# Patient Record
Sex: MALE | Race: WHITE | NOT HISPANIC OR LATINO
[De-identification: names, ages, dates, MRNs, and addresses within clinical notes are randomized per-mention and may not be internally consistent; named-entity substitution may affect disease eponyms.]

---

## 2021-12-15 ENCOUNTER — LABORATORY RESULT (OUTPATIENT)
Age: 86
End: 2021-12-15

## 2021-12-15 ENCOUNTER — APPOINTMENT (OUTPATIENT)
Dept: HEMATOLOGY ONCOLOGY | Facility: CLINIC | Age: 86
End: 2021-12-15
Payer: MEDICARE

## 2021-12-15 VITALS
BODY MASS INDEX: 22.53 KG/M2 | OXYGEN SATURATION: 98 % | RESPIRATION RATE: 18 BRPM | SYSTOLIC BLOOD PRESSURE: 163 MMHG | TEMPERATURE: 97.8 F | DIASTOLIC BLOOD PRESSURE: 64 MMHG | WEIGHT: 170 LBS | HEIGHT: 73 IN | HEART RATE: 63 BPM

## 2021-12-15 DIAGNOSIS — G60.0 HEREDITARY MOTOR AND SENSORY NEUROPATHY: ICD-10-CM

## 2021-12-15 DIAGNOSIS — D69.6 THROMBOCYTOPENIA, UNSPECIFIED: ICD-10-CM

## 2021-12-15 DIAGNOSIS — Z78.9 OTHER SPECIFIED HEALTH STATUS: ICD-10-CM

## 2021-12-15 DIAGNOSIS — Z85.820 PERSONAL HISTORY OF MALIGNANT MELANOMA OF SKIN: ICD-10-CM

## 2021-12-15 PROCEDURE — 99205 OFFICE O/P NEW HI 60 MIN: CPT

## 2021-12-15 RX ORDER — UBIDECARENONE/VIT E ACET 100MG-5
1000 CAPSULE ORAL
Refills: 0 | Status: ACTIVE | COMMUNITY

## 2021-12-15 RX ORDER — METFORMIN ER 750 MG 750 MG/1
750 TABLET ORAL TWICE DAILY
Refills: 0 | Status: ACTIVE | COMMUNITY

## 2021-12-15 RX ORDER — CHLORPHENIR/PHENYLEPH/ASPIRIN 2-7.8-325
TABLET, EFFERVESCENT ORAL
Refills: 0 | Status: ACTIVE | COMMUNITY

## 2021-12-15 RX ORDER — ROSUVASTATIN CALCIUM 20 MG/1
20 TABLET, FILM COATED ORAL
Refills: 0 | Status: ACTIVE | COMMUNITY

## 2021-12-15 RX ORDER — TAMSULOSIN HYDROCHLORIDE 0.4 MG/1
0.4 CAPSULE ORAL DAILY
Refills: 0 | Status: ACTIVE | COMMUNITY

## 2021-12-15 RX ORDER — CYANOCOBALAMIN (VITAMIN B-12) 1000 MCG
1000 TABLET ORAL
Refills: 0 | Status: ACTIVE | COMMUNITY

## 2021-12-15 RX ORDER — VALSARTAN 160 MG/1
160 TABLET ORAL
Refills: 0 | Status: ACTIVE | COMMUNITY

## 2021-12-17 LAB
25(OH)D3 SERPL-MCNC: 43.6 NG/ML
ALBUMIN SERPL ELPH-MCNC: 4.7 G/DL
ALP BLD-CCNC: 62 U/L
ALT SERPL-CCNC: 15 U/L
ANION GAP SERPL CALC-SCNC: 19 MMOL/L
APPEARANCE: CLEAR
AST SERPL-CCNC: 25 U/L
BILIRUB SERPL-MCNC: 0.5 MG/DL
BILIRUBIN URINE: NEGATIVE
BLOOD URINE: NEGATIVE
BUN SERPL-MCNC: 27 MG/DL
CALCIUM SERPL-MCNC: 9.6 MG/DL
CHLORIDE SERPL-SCNC: 105 MMOL/L
CO2 SERPL-SCNC: 21 MMOL/L
COLOR: YELLOW
CREAT SERPL-MCNC: 1.15 MG/DL
DEPRECATED KAPPA LC FREE/LAMBDA SER: 1.34 RATIO
ERYTHROCYTE [SEDIMENTATION RATE] IN BLOOD BY WESTERGREN METHOD: 13 MM/HR
FERRITIN SERPL-MCNC: 463 NG/ML
FOLATE SERPL-MCNC: >20 NG/ML
GLUCOSE QUALITATIVE U: NEGATIVE
GLUCOSE SERPL-MCNC: 91 MG/DL
HAPTOGLOB SERPL-MCNC: 135 MG/DL
IRON SATN MFR SERPL: 46 %
IRON SERPL-MCNC: 146 UG/DL
KAPPA LC CSF-MCNC: 1.68 MG/DL
KAPPA LC SERPL-MCNC: 2.25 MG/DL
KETONES URINE: NEGATIVE
LDH SERPL-CCNC: 255 U/L
LEUKOCYTE ESTERASE URINE: NEGATIVE
MAGNESIUM SERPL-MCNC: 2.6 MG/DL
NITRITE URINE: NEGATIVE
PH URINE: 5
PHOSPHATE SERPL-MCNC: 3.7 MG/DL
POTASSIUM SERPL-SCNC: 5 MMOL/L
PROT SERPL-MCNC: 6.9 G/DL
PROTEIN URINE: NEGATIVE
PSA FREE FLD-MCNC: 36 %
PSA FREE SERPL-MCNC: 0.81 NG/ML
PSA SERPL-MCNC: 2.25 NG/ML
RBC # BLD: 2.57 M/UL
RETICS # AUTO: 2.6 %
RETICS AGGREG/RBC NFR: 66.6 K/UL
SODIUM SERPL-SCNC: 145 MMOL/L
SPECIFIC GRAVITY URINE: 1.02
TIBC SERPL-MCNC: 320 UG/DL
TSH SERPL-ACNC: 2.1 UIU/ML
UIBC SERPL-MCNC: 173 UG/DL
UROBILINOGEN URINE: NORMAL
VIT B12 SERPL-MCNC: >2000 PG/ML

## 2021-12-17 NOTE — HISTORY OF PRESENT ILLNESS
[de-identified] : Mr. Rodrigues is an 86 year old male who is referred by Dr. Hakeem Esquivel for initial consultation for anemia and thrombocytopenia. \par He was noted on recent lab work in November 2021, to have a normochromic, normocytic anemia and thrombocytopenia with hemoglobin of 8.6, hematocrit of 26.1 RDW of 18.7 and platelet count of 77,000.  Previous lab work from July 2021 reveals a hemoglobin of 12.2 and platelet count of 171,000.  He reports feeling fatigued.  He has an appointment with the gastroenterologist later this month.  Most recent colonoscopy was approximately 5 years ago and Cologuard was 1 to 2 years ago which were both unremarkable.  In addition,  he has an unintentional weight loss of approximately 20 pounds over the past year and previously lost 60 pounds over the last several years.   He was also found to have bruising on his left forearm.  He has a history of superficial bladder cancer and is status post intravesical immunotherapy.  His most recent cystoscopy, approximately 3 months ago, was negative\par  [0 - No Distress] : Distress Level: 0

## 2021-12-17 NOTE — ASSESSMENT
[FreeTextEntry1] : This appears to be a relatively acute process.\par I will obtain a CBC with differential and a peripheral blood smear review.\par I will obtain a complete hematological workup to include a CMP, LDH, haptoglobin, reticulocyte count, B12/MMA/folic acid levels, TSH, SPEP, SIEP, IgQ,serum free light chains, HARINDER,  RF, panel, ferritin level, flow cytometry, PSA level, coagulation testing and a Antonino' test.  \par Given the unexplained weight loss I will obtain a noncontrast CT CAP to rule out an occult malignancy.\par He is also encouraged to continue with age-appropriate cancer screening.\par The patient will return in 3 weeks for followup, at his request, review of the above workup, and further recommendations.\par \par Thank you very much for allowing me to participate in the care of this patient. Should you have any questions or concerns please do not hesitate to call me directly.

## 2021-12-17 NOTE — REVIEW OF SYSTEMS
[Fatigue] : fatigue [Recent Change In Weight] : ~T recent weight change [Negative] : Constitutional [FreeTextEntry2] : 20 pound weight loss

## 2021-12-27 LAB
ANA SER IF-ACNC: NEGATIVE
METHYLMALONATE SERPL-SCNC: 335 NMOL/L

## 2022-01-04 ENCOUNTER — APPOINTMENT (OUTPATIENT)
Dept: HEMATOLOGY ONCOLOGY | Facility: CLINIC | Age: 87
End: 2022-01-04
Payer: MEDICARE

## 2022-01-04 VITALS
SYSTOLIC BLOOD PRESSURE: 138 MMHG | DIASTOLIC BLOOD PRESSURE: 73 MMHG | BODY MASS INDEX: 22.97 KG/M2 | HEART RATE: 94 BPM | RESPIRATION RATE: 16 BRPM | TEMPERATURE: 98.9 F | WEIGHT: 173.3 LBS | OXYGEN SATURATION: 100 % | HEIGHT: 73 IN

## 2022-01-04 DIAGNOSIS — D47.2 MONOCLONAL GAMMOPATHY: ICD-10-CM

## 2022-01-04 DIAGNOSIS — D64.9 ANEMIA, UNSPECIFIED: ICD-10-CM

## 2022-01-04 DIAGNOSIS — D69.6 THROMBOCYTOPENIA, UNSPECIFIED: ICD-10-CM

## 2022-01-04 DIAGNOSIS — R63.4 ABNORMAL WEIGHT LOSS: ICD-10-CM

## 2022-01-04 PROCEDURE — 99215 OFFICE O/P EST HI 40 MIN: CPT

## 2022-01-04 NOTE — REVIEW OF SYSTEMS
[Fatigue] : fatigue [Recent Change In Weight] : ~T recent weight change [Negative] : Allergic/Immunologic [FreeTextEntry2] : 20 pound weight loss.  Fatigue somewhat worse compared to the initial consultation visit.

## 2022-01-04 NOTE — HISTORY OF PRESENT ILLNESS
[0 - No Distress] : Distress Level: 0 [de-identified] : Reports being somewhat more fatigued since that time the last office evaluation.  Otherwise offers no new complaints. [de-identified] : Mr. Rodrigues is an 86 year old male who is referred by Dr. Hakeem Esquivel for initial consultation for anemia and thrombocytopenia. \par He was noted on recent lab work in November 2021, to have a normochromic, normocytic anemia and thrombocytopenia with hemoglobin of 8.6, hematocrit of 26.1 RDW of 18.7 and platelet count of 77,000.  Previous lab work from July 2021 reveals a hemoglobin of 12.2 and platelet count of 171,000.  He reports feeling fatigued.  He has an appointment with the gastroenterologist later this month.  Most recent colonoscopy was approximately 5 years ago and Cologuard was 1 to 2 years ago which were both unremarkable.  In addition,  he has an unintentional weight loss of approximately 20 pounds over the past year and previously lost 60 pounds over the last several years.   He was also found to have bruising on his left forearm.  He has a history of superficial bladder cancer and is status post intravesical immunotherapy.  His most recent cystoscopy, approximately 3 months ago, was negative\par

## 2022-01-04 NOTE — ASSESSMENT
[FreeTextEntry1] : This appears to be a relatively acute process.\par I obtained a CBC with differential and a peripheral blood smear review.\par I obtained a complete hematological workup to include a CMP, LDH, haptoglobin, reticulocyte count, B12/MMA/folic acid levels, TSH, SPEP, SIEP, IgQ,serum free light chains, HARINDER,  RF, panel, ferritin level, flow cytometry, PSA level, coagulation testing and a Antonino' test.  The flow cytometry and the peripheral blood smear review both showed approximately 1.5% blast population.  The serum immunoelectrophoresis revealed a weak IgG lambda monoclonal band.  Otherwise his blood counts remained stable with a platelet count in the 80,000 range and a hemoglobin in the 8 g/dL range.  His white count also remains elevated.  Given the above findings I feel that a primary bone marrow process needs to be ruled out and a bone marrow biopsy procedure will be arranged for.\par Given the unexplained weight loss I will obtain a noncontrast CT CAP to rule out an occult malignancy.  I will repeat his blood counts today given that he is complaining of increasing fatigue.  Should his anemia have worsened then he will be offered a transfusion of red blood cells.  I will also send for an RT-PCR for BCR–ABL transcripts.\par He is also encouraged to continue with age-appropriate cancer screening.  The above is discussed with the patient, and his daughter by phone.  They expressed understanding and agreement with  this plan.  He will return in 3 weeks for follow-up, review of the above-mentioned work-up and for further management planning.\par

## 2022-01-10 ENCOUNTER — RESULT REVIEW (OUTPATIENT)
Age: 87
End: 2022-01-10

## 2022-01-14 ENCOUNTER — NON-APPOINTMENT (OUTPATIENT)
Age: 87
End: 2022-01-14

## 2022-01-25 ENCOUNTER — APPOINTMENT (OUTPATIENT)
Dept: HEMATOLOGY ONCOLOGY | Facility: CLINIC | Age: 87
End: 2022-01-25
Payer: MEDICARE

## 2022-01-25 VITALS
DIASTOLIC BLOOD PRESSURE: 58 MMHG | SYSTOLIC BLOOD PRESSURE: 93 MMHG | HEIGHT: 73 IN | HEART RATE: 92 BPM | OXYGEN SATURATION: 100 % | BODY MASS INDEX: 20.15 KG/M2 | TEMPERATURE: 98.1 F | WEIGHT: 152 LBS | RESPIRATION RATE: 18 BRPM

## 2022-01-25 DIAGNOSIS — E86.0 DEHYDRATION: ICD-10-CM

## 2022-01-25 DIAGNOSIS — D46.9 MYELODYSPLASTIC SYNDROME, UNSPECIFIED: ICD-10-CM

## 2022-01-25 DIAGNOSIS — D64.9 ANEMIA, UNSPECIFIED: ICD-10-CM

## 2022-01-25 DIAGNOSIS — I95.9 HYPOTENSION, UNSPECIFIED: ICD-10-CM

## 2022-01-25 PROCEDURE — 99214 OFFICE O/P EST MOD 30 MIN: CPT | Mod: 25

## 2022-01-25 PROCEDURE — 36415 COLL VENOUS BLD VENIPUNCTURE: CPT

## 2022-01-25 NOTE — REASON FOR VISIT
[Follow-Up Visit] : a follow-up [FreeTextEntry2] : Anemia and thrombocytopenia/myelodysplastic syndrome

## 2022-01-25 NOTE — REVIEW OF SYSTEMS
[Fatigue] : fatigue [Recent Change In Weight] : ~T recent weight change [Negative] : Allergic/Immunologic [FreeTextEntry2] : 20 pound weight loss.  Continues to be fatigued.

## 2022-01-25 NOTE — HISTORY OF PRESENT ILLNESS
[0 - No Distress] : Distress Level: 0 [de-identified] : The patient presents for follow-up of anemia and thrombocytopenia.  Patient is status post hospitalization at [facility] for 4 days for possible pneumonia.  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  This was reviewed with the patient and his daughter and they were given information sheets on myelodysplastic syndrome.  We discussed using Aranesp 500 mcg every 3 weeks for anemia.  They also received information sheets on Aranesp.  He continues to be fatigued and is here in a wheelchair.  His daughter is concerned about his inability to take care of himself at home.  He is having visiting nurse services and  tomorrow to discuss possibilities for care. [de-identified] : Mr. Rodrigues is an 86 year old male who is referred by Dr. Hakeem Esquivel for initial consultation for anemia and thrombocytopenia. \par He was noted on recent lab work in November 2021, to have a normochromic, normocytic anemia and thrombocytopenia with hemoglobin of 8.6, hematocrit of 26.1 RDW of 18.7 and platelet count of 77,000.  Previous lab work from July 2021 reveals a hemoglobin of 12.2 and platelet count of 171,000.  He reports feeling fatigued.  He has an appointment with the gastroenterologist later this month.  Most recent colonoscopy was approximately 5 years ago and Cologuard was 1 to 2 years ago which were both unremarkable.  In addition,  he has an unintentional weight loss of approximately 20 pounds over the past year and previously lost 60 pounds over the last several years.   He was also found to have bruising on his left forearm.  He has a history of superficial bladder cancer and is status post intravesical immunotherapy.  His most recent cystoscopy, approximately 3 months ago, was negative\par

## 2022-01-25 NOTE — PHYSICAL EXAM
[Normal] : affect appropriate [Capable of only limited self care, confined to bed or chair more than 50% of waking hours] : Status 3- Capable of only limited self care, confined to bed or chair more than 50% of waking hours

## 2022-01-25 NOTE — ASSESSMENT
[FreeTextEntry1] : This appears to be a relatively acute process.\par I obtained a CBC with differential and a peripheral blood smear review.\par I obtained a complete hematological workup to include a CMP, LDH, haptoglobin, reticulocyte count, B12/MMA/folic acid levels, TSH, SPEP, SIEP, IgQ,serum free light chains, HARINDER,  RF, panel, ferritin level, flow cytometry, PSA level, coagulation testing and a Antonino' test.  The flow cytometry and the peripheral blood smear review both showed approximately 1.5% blast population.  The serum immunoelectrophoresis revealed a weak IgG lambda monoclonal band.  Otherwise his blood counts remained stable with a platelet count in the 80,000 range and a hemoglobin in the 8 g/dL range.  His white count also remains elevated.  Given the above findings I feel that a primary bone marrow process needs to be ruled out and a bone marrow biopsy procedure will be arranged for.\par Given the unexplained weight loss I will obtain a noncontrast CT CAP to rule out an occult malignancy.  I will repeat his blood counts today given that he is complaining of increasing fatigue.  Should his anemia have worsened then he will be offered a transfusion of red blood cells.  I will also send for an RT-PCR for BCR–ABL transcripts.\par He is also encouraged to continue with age-appropriate cancer screening.  The above is discussed with the patient, and his daughter by phone.  They expressed understanding and agreement with  this plan. 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  We the patient and his daughter were given information sheets on MDS.  We also discussed initiating Aranesp 500 mcg every 3 weeks for anemia.  Medication information sheets were given to the patient and his daughter.  He will be started today.\par Hypotension/dehydration we will hydrate the patient with 1000 mils normal saline over 90 minutes.\par Patient's daughter states that she will be following with visiting nurse service and  from [facility] to discuss possibility of care since he is unable to take care of himself. \par Continue routine, age-appropriate, healthcare maintenance \par History of present illness, review of systems, physical exam and treatment plan reviewed with .\par Office visit in 1 week or prn for new or worsening symptoms.

## 2022-02-01 NOTE — HISTORY OF PRESENT ILLNESS
[de-identified] : The patient presents for follow-up of anemia and thrombocytopenia.  Patient is status post hospitalization at [facility] for 4 days for possible pneumonia.  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  This was reviewed with the patient and his daughter and they were given information sheets on myelodysplastic syndrome.  We discussed using Aranesp 500 mcg every 3 weeks for anemia.  They also received information sheets on Aranesp.  He continues to be fatigued and is here in a wheelchair.  His daughter is concerned about his inability to take care of himself at home.  He is having visiting nurse services and  tomorrow to discuss possibilities for care. [0 - No Distress] : Distress Level: 0 [de-identified] : Mr. Rodrigues is an 86 year old male who is referred by Dr. Hakeem Esquivel for initial consultation for anemia and thrombocytopenia. \par He was noted on recent lab work in November 2021, to have a normochromic, normocytic anemia and thrombocytopenia with hemoglobin of 8.6, hematocrit of 26.1 RDW of 18.7 and platelet count of 77,000.  Previous lab work from July 2021 reveals a hemoglobin of 12.2 and platelet count of 171,000.  He reports feeling fatigued.  He has an appointment with the gastroenterologist later this month.  Most recent colonoscopy was approximately 5 years ago and Cologuard was 1 to 2 years ago which were both unremarkable.  In addition,  he has an unintentional weight loss of approximately 20 pounds over the past year and previously lost 60 pounds over the last several years.   He was also found to have bruising on his left forearm.  He has a history of superficial bladder cancer and is status post intravesical immunotherapy.  His most recent cystoscopy, approximately 3 months ago, was negative\par

## 2022-02-01 NOTE — ASSESSMENT
[FreeTextEntry1] : This appears to be a relatively acute process.\par I obtained a CBC with differential and a peripheral blood smear review.\par I obtained a complete hematological workup to include a CMP, LDH, haptoglobin, reticulocyte count, B12/MMA/folic acid levels, TSH, SPEP, SIEP, IgQ,serum free light chains, HARINDER,  RF, panel, ferritin level, flow cytometry, PSA level, coagulation testing and a Antonino' test.  The flow cytometry and the peripheral blood smear review both showed approximately 1.5% blast population.  The serum immunoelectrophoresis revealed a weak IgG lambda monoclonal band.  Otherwise his blood counts remained stable with a platelet count in the 80,000 range and a hemoglobin in the 8 g/dL range.  His white count also remains elevated.  Given the above findings I feel that a primary bone marrow process needs to be ruled out and a bone marrow biopsy procedure will be arranged for.\par Given the unexplained weight loss I will obtain a noncontrast CT CAP to rule out an occult malignancy.  I will repeat his blood counts today given that he is complaining of increasing fatigue.  Should his anemia have worsened then he will be offered a transfusion of red blood cells.  I will also send for an RT-PCR for BCR–ABL transcripts.\par He is also encouraged to continue with age-appropriate cancer screening.  The above is discussed with the patient, and his daughter by phone.  They expressed understanding and agreement with  this plan. 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  We the patient and his daughter were given information sheets on MDS.  We also discussed initiating Aranesp 500 mcg every 3 weeks for anemia.  Medication information sheets were given to the patient and his daughter.  He will be started today.\par Hypotension/dehydration we will hydrate the patient with 1000 mils normal saline over 90 minutes.\par Patient's daughter states that she will be following with visiting nurse service and  from [facility] to discuss possibility of care since he is unable to take care of himself. \par Continue routine, age-appropriate, healthcare maintenance \par History of present illness, review of systems, physical exam and treatment plan reviewed with .\par Office visit in 1 week or prn for new or worsening symptoms.

## 2022-02-03 ENCOUNTER — APPOINTMENT (OUTPATIENT)
Dept: HEMATOLOGY ONCOLOGY | Facility: CLINIC | Age: 87
End: 2022-02-03

## 2022-02-15 ENCOUNTER — APPOINTMENT (OUTPATIENT)
Dept: HEMATOLOGY ONCOLOGY | Facility: CLINIC | Age: 87
End: 2022-02-15

## 2022-09-03 LAB
ALBUMIN MFR SERPL ELPH: 64.8 %
ALBUMIN SERPL-MCNC: 4.5 G/DL
ALBUMIN/GLOB SERPL: 1.9 RATIO
ALPHA1 GLOB MFR SERPL ELPH: 3.3 %
ALPHA1 GLOB SERPL ELPH-MCNC: 0.2 G/DL
ALPHA2 GLOB MFR SERPL ELPH: 12 %
ALPHA2 GLOB SERPL ELPH-MCNC: 0.8 G/DL
B-GLOBULIN MFR SERPL ELPH: 9.5 %
B-GLOBULIN SERPL ELPH-MCNC: 0.7 G/DL
DEPRECATED KAPPA LC FREE/LAMBDA SER: 1.34 RATIO
GAMMA GLOB FLD ELPH-MCNC: 0.7 G/DL
GAMMA GLOB MFR SERPL ELPH: 10.4 %
IGA SER QL IEP: 86 MG/DL
IGG SER QL IEP: 868 MG/DL
IGM SER QL IEP: 65 MG/DL
INTERPRETATION SERPL IEP-IMP: NORMAL
KAPPA LC CSF-MCNC: 1.68 MG/DL
KAPPA LC SERPL-MCNC: 2.25 MG/DL
M PROTEIN MFR SERPL ELPH: NORMAL
M PROTEIN SPEC IFE-MCNC: NORMAL
MONOCLON BAND OBS SERPL: NORMAL
PROT SERPL-MCNC: 6.9 G/DL
PROT SERPL-MCNC: 6.9 G/DL
T(9;22)(ABL1,BCR)/CONTROL BLD/T: NORMAL